# Patient Record
Sex: MALE | Race: WHITE | NOT HISPANIC OR LATINO | Employment: UNEMPLOYED | URBAN - METROPOLITAN AREA
[De-identification: names, ages, dates, MRNs, and addresses within clinical notes are randomized per-mention and may not be internally consistent; named-entity substitution may affect disease eponyms.]

---

## 2021-04-15 ENCOUNTER — HOSPITAL ENCOUNTER (EMERGENCY)
Facility: CLINIC | Age: 26
Discharge: OTHER INSTITUTION | End: 2021-04-15
Attending: EMERGENCY MEDICINE | Admitting: EMERGENCY MEDICINE
Payer: COMMERCIAL

## 2021-04-15 VITALS
SYSTOLIC BLOOD PRESSURE: 137 MMHG | RESPIRATION RATE: 18 BRPM | BODY MASS INDEX: 19.83 KG/M2 | DIASTOLIC BLOOD PRESSURE: 77 MMHG | HEART RATE: 107 BPM | TEMPERATURE: 97.1 F | WEIGHT: 150.3 LBS | OXYGEN SATURATION: 96 %

## 2021-04-15 DIAGNOSIS — F31.9 BIPOLAR AFFECTIVE DISORDER, REMISSION STATUS UNSPECIFIED (H): ICD-10-CM

## 2021-04-15 DIAGNOSIS — F10.20 UNCOMPLICATED ALCOHOL DEPENDENCE (H): ICD-10-CM

## 2021-04-15 DIAGNOSIS — F41.1 GAD (GENERALIZED ANXIETY DISORDER): ICD-10-CM

## 2021-04-15 DIAGNOSIS — F33.9 RECURRENT MAJOR DEPRESSIVE DISORDER, REMISSION STATUS UNSPECIFIED (H): ICD-10-CM

## 2021-04-15 LAB
ALCOHOL BREATH TEST: 0.15 (ref 0–0.01)
AMPHETAMINES UR QL SCN: NEGATIVE
BARBITURATES UR QL: NEGATIVE
BENZODIAZ UR QL: NEGATIVE
CANNABINOIDS UR QL SCN: NEGATIVE
COCAINE UR QL: NEGATIVE
ETHANOL UR QL SCN: POSITIVE
OPIATES UR QL SCN: NEGATIVE

## 2021-04-15 PROCEDURE — 99283 EMERGENCY DEPT VISIT LOW MDM: CPT | Performed by: EMERGENCY MEDICINE

## 2021-04-15 PROCEDURE — 80307 DRUG TEST PRSMV CHEM ANLYZR: CPT | Performed by: EMERGENCY MEDICINE

## 2021-04-15 PROCEDURE — 80320 DRUG SCREEN QUANTALCOHOLS: CPT | Performed by: EMERGENCY MEDICINE

## 2021-04-15 ASSESSMENT — ENCOUNTER SYMPTOMS
PSYCHIATRIC NEGATIVE: 1
HALLUCINATIONS: 0
SLEEP DISTURBANCE: 0

## 2021-04-16 NOTE — DISCHARGE INSTRUCTIONS
Discharge to [facility].     Take your medications as prescribed.     Pursue cd treatment at [facility].

## 2021-04-16 NOTE — ED TRIAGE NOTES
Pt drank today, last drink at 1600.  Pt kicked out of [facility], Pt was sober for seven months.  Pt drank a half a pint today.  Pt had aortic valve repair at [facility] 2/21.

## 2021-04-16 NOTE — ED PROVIDER NOTES
ED Provider Note  [facility]      History     Chief Complaint   Patient presents with     Addiction Problem     Pt was in sober living house and drank today, kicked out of sober living house.  Pt last drank at 1600 today.  Pt lookign to go to [facility].     HPI  Jose Carlos Santillan is a 26 year old male with a medical history signficant for methadone overdose, recent positive COVID-19 (4/3/2021), bipolar disorder, MAIKEL, Marfan syndrome, enlarged aorta, and s/p aortic root replacement (2/26/2021) who presents to the ED for evaluation of an addiction problem. He says he has been staying at a sober house.  He relapsed yesterday and did not drink today.  He had aortic surgery in February and it was much more difficult that he expected.  he has hx of heroin dependence and has been sober for 6 years.  He then turned to alcohol but has been sober from alcohol for 7 months.  He had a rule 25 prior to his 7 months of sobriety.  He liked his sober house.  He feels he may need inpatient for awhile since he is doing outpatient cd and he relapsed once yesterday.  He is hoping to get into [facility] program and his rule 25 can be updated.  He says he received dilaudid for several weeks when he had his aortic surgery and thinks the dilaudid triggered his addiction issues.  He has bipolar disorder, MDD, MAIKEL and feels that his mental health has been stable.  His parents brought him here.  They drink at their home so it isn't good being there.  He has some passive si but no plan or intent. He is medication compliant.  He had covid 19 positive test April 3rd but never had symptoms before or after the test.  He continues to have no symptoms.     Past Medical History  Past Medical History:   Diagnosis Date     Enlarged aorta (H)      Marfan syndrome      Past Surgical History:   Procedure Laterality Date     THORACIC SURGERY  2006    chest repair     acetaminophen (TYLENOL) 500 MG tablet  ascorbic acid (VITAMIN  C) 1000 MG TABS  citalopram (CELEXA) 20 MG tablet  cyclobenzaprine (FLEXERIL) 10 MG tablet  losartan (COZAAR) 50 MG tablet  losartan (COZAAR) 50 MG tablet  metoprolol (TOPROL-XL) 12.5 MG TABS  sulfamethoxazole-trimethoprim (BACTRIM) 400-80 MG per tablet  traMADol (ULTRAM) 50 MG tablet      No Known Allergies  Family History  Family History   Problem Relation Age of Onset     Cerebrovascular Disease Maternal Grandmother      Heart Disease Paternal Grandfather      Social History   Social History     Tobacco Use     Smoking status: Current Every Day Smoker     Packs/day: 0.50     Years: 4.00     Pack years: 2.00     Smokeless tobacco: Never Used   Substance Use Topics     Alcohol use: No     Drug use: Yes     Comment: heroin(last 8 days ago)      Past medical history, past surgical history, medications, allergies, family history, and social history were reviewed with the patient. No additional pertinent items.       Review of Systems   Psychiatric/Behavioral: Negative.  Negative for hallucinations, self-injury and sleep disturbance. Suicidal ideas: passive only.   All other systems reviewed and are negative.    A complete review of systems was performed with pertinent positives and negatives noted in the HPI, and all other systems negative.    Physical Exam   BP: (!) 149/90  Pulse: 109  Temp: 97.1  F (36.2  C)  Resp: 18  Weight: 68.2 kg (150 lb 4.8 oz)  SpO2: 97 %  Physical Exam  Vitals signs and nursing note reviewed.   HENT:      Head: Normocephalic and atraumatic.      Nose: Nose normal.   Eyes:      Extraocular Movements: Extraocular movements intact.   Neck:      Musculoskeletal: Normal range of motion.   Cardiovascular:      Rate and Rhythm: Normal rate.   Pulmonary:      Effort: Pulmonary effort is normal.   Musculoskeletal: Normal range of motion.   Skin:     General: Skin is warm and dry.   Neurological:      General: No focal deficit present.      Mental Status: He is alert and oriented to person, place, and  time.   Psychiatric:         Attention and Perception: Attention and perception normal.         Mood and Affect: Mood is anxious and depressed. Affect is tearful.         Speech: Speech normal.         Behavior: Behavior normal. Behavior is cooperative.         Thought Content: Thought content is not paranoid or delusional. Thought content includes suicidal (passive occasionally) ideation. Thought content does not include homicidal ideation. Thought content does not include homicidal or suicidal plan.         Cognition and Memory: Cognition and memory normal.         Judgment: Judgment normal.         ED Course      Procedures        The medical record was reviewed and interpreted.  Current labs reviewed and interpreted.       Results for orders placed or performed during the hospital encounter of 04/15/21   Alcohol breath test POCT     Status: Abnormal   Result Value Ref Range    Alcohol Breath Test 0.147 (A) 0.00 - 0.01     Medications - No data to display     Assessments & Plan (with Medical Decision Making)   The patient has hx of bipolar disorder, MDD, MAIKEL, alcohol dependence, opiate dependence who presents to the ED having relapsed on alcohol once yesterday while staying at his sober house.  He says he has not used opiates for 6 years.  He drank once yesterday.  He was kicked out of his sober house and has no where to go.  He would like to go the [facility] in [place] and is hoping he can get his rule 25 updated quickly so he can go there.  He is hoping to go to [facility] today.  He denies acute mood issues regarding his bipolar disorder, MAIKEL, MDD.  He is med compliant.  He has passive sometimes occasionally. He had a positive covid 19 test on 4/3 but never had symptoms. According to the CDC recommendations he is allowed to be around others 10 days after symptoms/positive test.  He is medically cleared based on this to go to a public setting.  We spoke to [facility] and they have a bed available.  This  would be our recommendation.  He can be is a supportive sober environment and they can help him get placement at [facility].  I did sent a covid test today but they can be positive for weeks and are not helpful in this situation.     I have reviewed the nursing notes. I have reviewed the findings, diagnosis, plan and need for follow up with the patient.    New Prescriptions    No medications on file       Final diagnoses:   Uncomplicated alcohol dependence (H)   Bipolar affective disorder, remission status unspecified (H)   MAIKEL (generalized anxiety disorder)   Recurrent major depressive disorder, remission status unspecified (H)       --    [facility] EMERGENCY DEPARTMENT  4/15/2021     Ewa Estrada MD  04/15/21 [id number]